# Patient Record
Sex: FEMALE | Race: WHITE | URBAN - METROPOLITAN AREA
[De-identification: names, ages, dates, MRNs, and addresses within clinical notes are randomized per-mention and may not be internally consistent; named-entity substitution may affect disease eponyms.]

---

## 2017-05-24 ENCOUNTER — APPOINTMENT (OUTPATIENT)
Dept: URBAN - METROPOLITAN AREA CLINIC 211 | Age: 47
Setting detail: DERMATOLOGY
End: 2017-05-26

## 2017-05-24 DIAGNOSIS — D22 MELANOCYTIC NEVI: ICD-10-CM

## 2017-05-24 DIAGNOSIS — L663 OTHER SPECIFIED DISEASES OF HAIR AND HAIR FOLLICLES: ICD-10-CM

## 2017-05-24 DIAGNOSIS — L91.8 OTHER HYPERTROPHIC DISORDERS OF THE SKIN: ICD-10-CM

## 2017-05-24 DIAGNOSIS — L738 OTHER SPECIFIED DISEASES OF HAIR AND HAIR FOLLICLES: ICD-10-CM

## 2017-05-24 DIAGNOSIS — L82.1 OTHER SEBORRHEIC KERATOSIS: ICD-10-CM

## 2017-05-24 DIAGNOSIS — D18.0 HEMANGIOMA: ICD-10-CM

## 2017-05-24 DIAGNOSIS — L81.4 OTHER MELANIN HYPERPIGMENTATION: ICD-10-CM

## 2017-05-24 DIAGNOSIS — D485 NEOPLASM OF UNCERTAIN BEHAVIOR OF SKIN: ICD-10-CM

## 2017-05-24 PROBLEM — D18.01 HEMANGIOMA OF SKIN AND SUBCUTANEOUS TISSUE: Status: ACTIVE | Noted: 2017-05-24

## 2017-05-24 PROBLEM — D48.5 NEOPLASM OF UNCERTAIN BEHAVIOR OF SKIN: Status: ACTIVE | Noted: 2017-05-24

## 2017-05-24 PROBLEM — D22.5 MELANOCYTIC NEVI OF TRUNK: Status: ACTIVE | Noted: 2017-05-24

## 2017-05-24 PROBLEM — L02.422 FURUNCLE OF LEFT AXILLA: Status: ACTIVE | Noted: 2017-05-24

## 2017-05-24 PROCEDURE — OTHER REASSURANCE: OTHER

## 2017-05-24 PROCEDURE — OTHER OBSERVATION: OTHER

## 2017-05-24 PROCEDURE — OTHER BENIGN DESTRUCTION COSMETIC: OTHER

## 2017-05-24 PROCEDURE — OTHER TREATMENT REGIMEN: OTHER

## 2017-05-24 PROCEDURE — OTHER MIPS QUALITY: OTHER

## 2017-05-24 PROCEDURE — OTHER OBSERVATION AND MEASURE: OTHER

## 2017-05-24 PROCEDURE — OTHER COUNSELING: OTHER

## 2017-05-24 PROCEDURE — 99203 OFFICE O/P NEW LOW 30 MIN: CPT | Mod: 25

## 2017-05-24 PROCEDURE — OTHER BIOPSY BY SHAVE METHOD: OTHER

## 2017-05-24 PROCEDURE — 11100: CPT

## 2017-05-24 PROCEDURE — OTHER PRESCRIPTION: OTHER

## 2017-05-24 RX ORDER — BENZOYL PEROXIDE 3.18 G/60G
AEROSOL, FOAM TOPICAL
Qty: 1 | Refills: 2 | Status: ERX | COMMUNITY
Start: 2017-05-24

## 2017-05-24 ASSESSMENT — LOCATION SIMPLE DESCRIPTION DERM
LOCATION SIMPLE: ABDOMEN
LOCATION SIMPLE: RIGHT BACK
LOCATION SIMPLE: RIGHT UPPER BACK
LOCATION SIMPLE: NOSE
LOCATION SIMPLE: LEFT POSTERIOR AXILLA
LOCATION SIMPLE: UPPER BACK

## 2017-05-24 ASSESSMENT — LOCATION DETAILED DESCRIPTION DERM
LOCATION DETAILED: SUPERIOR THORACIC SPINE
LOCATION DETAILED: LEFT POSTERIOR AXILLA
LOCATION DETAILED: NASAL ROOT
LOCATION DETAILED: EPIGASTRIC SKIN
LOCATION DETAILED: RIGHT INFERIOR MEDIAL UPPER BACK
LOCATION DETAILED: RIGHT RIB CAGE
LOCATION DETAILED: RIGHT SUPERIOR LATERAL UPPER BACK
LOCATION DETAILED: RIGHT SUPERIOR UPPER BACK
LOCATION DETAILED: SUBXIPHOID

## 2017-05-24 ASSESSMENT — LOCATION ZONE DERM
LOCATION ZONE: NOSE
LOCATION ZONE: TRUNK
LOCATION ZONE: AXILLAE

## 2017-05-24 NOTE — PROCEDURE: BENIGN DESTRUCTION COSMETIC
Post-Care Instructions: I reviewed with the patient in detail post-care instructions. Patient is to wear sunprotection, and avoid picking at any of the treated lesions. Pt may apply Vaseline to crusted or scabbing areas.
Consent: The patient's consent was obtained including but not limited to risks of crusting, scabbing, blistering, scarring, darker or lighter pigmentary change, recurrence, incomplete removal and infection.
Detail Level: Detailed
Anesthesia Type: 1% lidocaine with epinephrine
Anesthesia Volume In Cc: 0.5

## 2017-05-24 NOTE — PROCEDURE: OBSERVATION
Detail Level: Detailed
Size Of Lesion: 2x2mm
Size Of Lesion: 4x2mm
Size Of Lesion: 3x2mm
Size Of Lesion: 5x2mm

## 2017-05-24 NOTE — PROCEDURE: BIOPSY BY SHAVE METHOD
Anesthesia Volume In Cc (Will Not Render If 0): 0.5
Detail Level: Detailed
Bill 90843 For Specimen Handling/Conveyance To Laboratory?: no
Curettage Text: The wound bed was treated with curettage after the biopsy was performed.
Consent: Written consent was obtained and risks were reviewed including but not limited to scarring, infection, bleeding, scabbing, incomplete removal, nerve damage and allergy to anesthesia.
Biopsy Type: H and E
X Size Of Lesion In Cm: 0.6
Electrodesiccation Text: The wound bed was treated with electrodesiccation after the biopsy was performed.
Billing Type: Third-Party Bill
Type Of Destruction Used: Curettage
Biopsy Method: Dermablade
Hemostasis: Aluminum Chloride
Silver Nitrate Text: The wound bed was treated with silver nitrate after the biopsy was performed.
Electrodesiccation And Curettage Text: The wound bed was treated with electrodesiccation and curettage after the biopsy was performed.
Notification Instructions: Patient will be notified of biopsy results. However, patient instructed to call the office if not contacted within 2 weeks.
Dressing: bandage
Anesthesia Type: 1% lidocaine with epinephrine
Additional Anesthesia Volume In Cc (Will Not Render If 0): 0
Post-Care Instructions: I reviewed with the patient in detail post-care instructions. Patient is to keep the biopsy site dry overnight, and then apply bacitracin twice daily until healed. Patient may apply hydrogen peroxide soaks to remove any crusting.
Wound Care: Vaseline
Cryotherapy Text: The wound bed was treated with cryotherapy after the biopsy was performed.

## 2017-05-24 NOTE — PROCEDURE: MIPS QUALITY
Quality 226: Preventive Care And Screening: Tobacco Use: Screening And Cessation Intervention: Patient screened for tobacco and never smoked
Quality 131: Pain Assessment And Follow-Up: Pain assessment documented as positive using a standardized tool AND a follow-up plan is documented
Quality 110: Preventive Care And Screening: Influenza Immunization: Influenza Immunization not Administered because Patient Refused.
Quality 130: Documentation Of Current Medications In The Medical Record: Current Medications Documented
Quality 265: Biopsy Follow-Up: Biopsy results reviewed, communicated, tracked, and documented
Detail Level: Detailed
Quality 431: Preventive Care And Screening: Unhealthy Alcohol Use - Screening: Patient screened for unhealthy alcohol use using a single question and scores less than 2 times per year
Quality 400a: One-Time Screening For Hepatitis C Virus (Hcv) For All Patients: Documentation of patient reason(s) for not receiving one-time screening for HCV infection

## 2017-05-24 NOTE — PROCEDURE: TREATMENT REGIMEN
Detail Level: Zone
Plan: Mentioned will likely refer to MOHs if bx shows BCC
Initiate Treatment: BenzePro foam AA BID. Pt to RTC if rash spreads or worsens (photos reviewed by LENARD)

## 2017-05-31 ENCOUNTER — RX ONLY (RX ONLY)
Age: 47
End: 2017-05-31

## 2017-05-31 RX ORDER — BENZOYL PEROXIDE 3.18 G/60G
AEROSOL, FOAM TOPICAL
Qty: 1 | Refills: 2 | Status: ERX

## 2019-12-20 ENCOUNTER — APPOINTMENT (OUTPATIENT)
Dept: URBAN - METROPOLITAN AREA CLINIC 211 | Age: 49
Setting detail: DERMATOLOGY
End: 2019-12-30

## 2019-12-20 ENCOUNTER — APPOINTMENT (OUTPATIENT)
Dept: URBAN - METROPOLITAN AREA CLINIC 211 | Age: 49
Setting detail: DERMATOLOGY
End: 2019-12-27

## 2019-12-20 DIAGNOSIS — Z41.9 ENCOUNTER FOR PROCEDURE FOR PURPOSES OTHER THAN REMEDYING HEALTH STATE, UNSPECIFIED: ICD-10-CM

## 2019-12-20 DIAGNOSIS — L98.8 OTHER SPECIFIED DISORDERS OF THE SKIN AND SUBCUTANEOUS TISSUE: ICD-10-CM

## 2019-12-20 DIAGNOSIS — D18.0 HEMANGIOMA: ICD-10-CM

## 2019-12-20 DIAGNOSIS — Z85.828 PERSONAL HISTORY OF OTHER MALIGNANT NEOPLASM OF SKIN: ICD-10-CM

## 2019-12-20 DIAGNOSIS — D22 MELANOCYTIC NEVI: ICD-10-CM

## 2019-12-20 DIAGNOSIS — L82.1 OTHER SEBORRHEIC KERATOSIS: ICD-10-CM

## 2019-12-20 DIAGNOSIS — R21 RASH AND OTHER NONSPECIFIC SKIN ERUPTION: ICD-10-CM

## 2019-12-20 DIAGNOSIS — L81.4 OTHER MELANIN HYPERPIGMENTATION: ICD-10-CM

## 2019-12-20 PROBLEM — D18.01 HEMANGIOMA OF SKIN AND SUBCUTANEOUS TISSUE: Status: ACTIVE | Noted: 2019-12-20

## 2019-12-20 PROBLEM — D22.5 MELANOCYTIC NEVI OF TRUNK: Status: ACTIVE | Noted: 2019-12-20

## 2019-12-20 PROBLEM — J30.1 ALLERGIC RHINITIS DUE TO POLLEN: Status: ACTIVE | Noted: 2019-12-20

## 2019-12-20 PROCEDURE — OTHER MIPS QUALITY: OTHER

## 2019-12-20 PROCEDURE — OTHER PRESCRIPTION: OTHER

## 2019-12-20 PROCEDURE — OTHER OTHER (COSMETIC): OTHER

## 2019-12-20 PROCEDURE — OTHER COUNSELING: OTHER

## 2019-12-20 PROCEDURE — OTHER OBSERVATION AND MEASURE: OTHER

## 2019-12-20 PROCEDURE — OTHER REASSURANCE: OTHER

## 2019-12-20 PROCEDURE — 99214 OFFICE O/P EST MOD 30 MIN: CPT

## 2019-12-20 PROCEDURE — OTHER OBSERVATION: OTHER

## 2019-12-20 PROCEDURE — OTHER COSMETIC CONSULTATION: BOTULINUM TOXIN: OTHER

## 2019-12-20 PROCEDURE — OTHER TREATMENT REGIMEN: OTHER

## 2019-12-20 RX ORDER — TRIAMCINOLONE ACETONIDE 0.25 MG/G
CREAM TOPICAL
Qty: 1 | Refills: 0 | Status: ERX | COMMUNITY
Start: 2019-12-20

## 2019-12-20 ASSESSMENT — LOCATION DETAILED DESCRIPTION DERM
LOCATION DETAILED: SUPERIOR THORACIC SPINE
LOCATION DETAILED: EPIGASTRIC SKIN
LOCATION DETAILED: RIGHT INFERIOR MEDIAL UPPER BACK
LOCATION DETAILED: LEFT INFERIOR MEDIAL FOREHEAD
LOCATION DETAILED: NASAL ROOT
LOCATION DETAILED: SUBXIPHOID
LOCATION DETAILED: RIGHT SUPERIOR LATERAL UPPER BACK
LOCATION DETAILED: RIGHT AXILLARY VAULT
LOCATION DETAILED: RIGHT SUPERIOR UPPER BACK
LOCATION DETAILED: RIGHT RIB CAGE

## 2019-12-20 ASSESSMENT — LOCATION ZONE DERM
LOCATION ZONE: TRUNK
LOCATION ZONE: AXILLAE
LOCATION ZONE: FACE
LOCATION ZONE: NOSE

## 2019-12-20 ASSESSMENT — LOCATION SIMPLE DESCRIPTION DERM
LOCATION SIMPLE: UPPER BACK
LOCATION SIMPLE: ABDOMEN
LOCATION SIMPLE: RIGHT AXILLARY VAULT
LOCATION SIMPLE: RIGHT BACK
LOCATION SIMPLE: NOSE
LOCATION SIMPLE: RIGHT UPPER BACK
LOCATION SIMPLE: LEFT FOREHEAD

## 2019-12-20 NOTE — PROCEDURE: TREATMENT REGIMEN
Initiate Treatment: TAC 0.025% cream (local)
Plan: Discussed with patient to use a 3 blade razor and replace them after 7 uses. Recommended to patient Aveeno shave foam. Advised to try Vanicream Deodorant. Asked patient to start noting when the rash occurs and to apply TAC cream at that time.\\n\\nI discussed possible rash that she describes could be either folliculitis or contact dermatitis
Otc Regimen: Vanicream Deodorant
Detail Level: Zone

## 2019-12-20 NOTE — PROCEDURE: MIPS QUALITY
Quality 431: Preventive Care And Screening: Unhealthy Alcohol Use - Screening: Patient screened for unhealthy alcohol use using a single question and scores less than 2 times per year
Quality 110: Preventive Care And Screening: Influenza Immunization: Influenza immunization was not ordered or administered, reason not given
Quality 131: Pain Assessment And Follow-Up: Pain assessment using a standardized tool is documented as negative, no follow-up plan required
Quality 226: Preventive Care And Screening: Tobacco Use: Screening And Cessation Intervention: Patient screened for tobacco use and is an ex/non-smoker
Detail Level: Detailed
Quality 130: Documentation Of Current Medications In The Medical Record: Current Medications Documented

## 2019-12-20 NOTE — PROCEDURE: MIPS QUALITY
Quality 226: Preventive Care And Screening: Tobacco Use: Screening And Cessation Intervention: Patient screened for tobacco use and is an ex/non-smoker
Quality 431: Preventive Care And Screening: Unhealthy Alcohol Use - Screening: Patient screened for unhealthy alcohol use using a single question and scores less than 2 times per year
Quality 130: Documentation Of Current Medications In The Medical Record: Current Medications Documented
Detail Level: Detailed
Quality 110: Preventive Care And Screening: Influenza Immunization: Influenza immunization was not ordered or administered, reason not given

## 2019-12-20 NOTE — PROCEDURE: OBSERVATION
Detail Level: Detailed
Size Of Lesion: 3x2mm
Size Of Lesion: 2x2mm
Size Of Lesion: 5x2mm
Size Of Lesion: 4x2mm

## 2019-12-20 NOTE — PROCEDURE: OTHER (COSMETIC)
Detail Level: Zone
Other (Free Text): Cosmetic Consultation\\nPE:uneven pigmentation, dullness to skin, excess subcutaneous fat in the submental area and horizontal neck lines\\n\\nPLAN:\\n\\n1.  Pt is not a candidate for CoolSculpting due to the narrowness of her chin.  Spoke briefly to pt about Kybella. It pt was not interested in that route.  Offer Dr. Phani Wright contact info for surgery but she was not interested in that option either.\\n\\n2.  Rec pt begin to incorporate regular facials every 4-6 weeks.  Rec a silk peel.  The procedure was reviewed in detail, r&b and post tx expectations.  Informational handout given with a cq.\\n\\n\\nNOTE:\\n\\Joi indications, treatment expectations (including management of any possible irritations), protocols, risks and benefits, pre/post care are reviewed.  Patient understands that multiple treatments may be necessary for optimum results and that on going maintenance with at-home products and additional office visits or treatments may be needed to enhance and extend the desired results.\\n\\Joi questions were addressed.\\n\\nRTC-tbd

## 2019-12-20 NOTE — HPI: OTHER
Condition:: Cosmetic
Please Describe Your Condition:: Pt is interested in options for excess fat and horizontal neck lines on her neck.  Pt is also interested in options for her skin.  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  Pt uses a cleanser and moisturizer daily.  Pt does not want to do anything too drastic and likes natural things.  Medications, allergies and medical hx were reviewed.

## 2019-12-23 ENCOUNTER — APPOINTMENT (OUTPATIENT)
Dept: URBAN - METROPOLITAN AREA CLINIC 211 | Age: 49
Setting detail: DERMATOLOGY
End: 2019-12-26

## 2019-12-23 DIAGNOSIS — Z41.9 ENCOUNTER FOR PROCEDURE FOR PURPOSES OTHER THAN REMEDYING HEALTH STATE, UNSPECIFIED: ICD-10-CM

## 2019-12-23 PROCEDURE — OTHER OTHER (COSMETIC): OTHER

## 2019-12-23 PROCEDURE — OTHER MIPS QUALITY: OTHER

## 2019-12-23 ASSESSMENT — LOCATION SIMPLE DESCRIPTION DERM: LOCATION SIMPLE: LEFT CHEEK

## 2019-12-23 ASSESSMENT — LOCATION DETAILED DESCRIPTION DERM: LOCATION DETAILED: LEFT INFERIOR MEDIAL MALAR CHEEK

## 2019-12-23 ASSESSMENT — LOCATION ZONE DERM: LOCATION ZONE: FACE

## 2019-12-23 NOTE — HPI: OTHER
Condition:: Cosmetic
Please Describe Your Condition:: comes in for a chief complaint of Cosmetic. Pt presents for silk peel to full face for skin maintenance.   Medications, allergies and medical history were reviewed with patient

## 2019-12-23 NOTE — PROCEDURE: OTHER (COSMETIC)
Other (Free Text): Silk Peel Dermalinfusion\\n\\nPE: dullness, dehydrated, skin maintenance \\n \\nPrior to treatment, all but not limited to treatment indications and expectations(including management of any possible irritations) protocols, risks and benefits were reviewed in detail, including post treatment expectations. All questions were answered prior to administering the treatment.\\n\\nTreatment # 1/1\\nTreatment Site: full face\\n\\nCleanse: [place] Gentle cleanser, Shari Clarifying Polish, PCA nutrient toner\\n\\nSelected Serum: Ultra Hydrating\\nGrit Size: 9-30\\nPSI: 2.5-2.6\\n\\nZO Invisapeel, Cherry Garcia Mask was gently massaged on and occluded with a warm schwarz towel.  Mask was removed within a few minutes or as tolerated.\\n\\nProducts applied: Shari Mist, Obagi CRX day lotion, Elta MD Lip balm, DEJ eye cream, Obagi SPF + tint\\n\\nNotes:\\n\\n1. Discussed HA fillers and Botox.  Procedures reviewed in detail, R&B and post treatment expectations.  Rationale was given along with a CQ.\\n\\Joi indications, treatment expectations (including management of any possible irritations), protocols, risks and benefits, pre/post care are reviewed. Additional information can be found on consent documentation. Patient understands that multiple treatments may be necessary for optimum results and that on going maintenance with at-home products and additional office visits or treatments may be needed to enhance and extend the desired results.\\n\\nStandard protocol was done.  Following treatment, the expected mild erythema was observed. Patient tolerated the procedure well without immediate complication. Post care was reviewed and a follow up appointment was recommended.\\n\\Joi questions were addressed.\\n\\nRTC: 4-6 weeks Other (Free Text): Silk Peel Dermalinfusion\\n\\nPE: dullness, dehydrated, skin maintenance \\n \\nPrior to treatment, all but not limited to treatment indications and expectations(including management of any possible irritations) protocols, risks and benefits were reviewed in detail, including post treatment expectations. All questions were answered prior to administering the treatment.\\n\\nTreatment # 1/1\\nTreatment Site: full face\\n\\nCleanse: [place] Gentle cleanser, Shari Clarifying Polish, PCA nutrient toner\\n\\nSelected Serum: Ultra Hydrating\\nGrit Size: 9-30\\nPSI: 2.5-2.6\\n\\nZO Invisapeel, Cherry Garcia Mask was gently massaged on and occluded with a warm schwarz towel.  Mask was removed within a few minutes or as tolerated.\\n\\nProducts applied: Shari Mist, Obagi CRX day lotion, Elta MD Lip balm, DEJ eye cream, Obagi SPF + tint\\n\\nNotes:\\n\\n1. Discussed HA fillers and Botox.  Procedures reviewed in detail, R&B and post treatment expectations.  Rationale was given along with a CQ.\\n\\Joi indications, treatment expectations (including management of any possible irritations), protocols, risks and benefits, pre/post care are reviewed. Additional information can be found on consent documentation. Patient understands that multiple treatments may be necessary for optimum results and that on going maintenance with at-home products and additional office visits or treatments may be needed to enhance and extend the desired results.\\n\\nStandard protocol was done.  Following treatment, the expected mild erythema was observed. Patient tolerated the procedure well without immediate complication. Post care was reviewed and a follow up appointment was recommended.\\n\\Joi questions were addressed.\\n\\nRTC: 4-6 weeks

## 2024-07-25 ENCOUNTER — APPOINTMENT (OUTPATIENT)
Dept: URBAN - METROPOLITAN AREA CLINIC 211 | Age: 54
Setting detail: DERMATOLOGY
End: 2024-07-26

## 2024-07-25 DIAGNOSIS — Z12.83 ENCOUNTER FOR SCREENING FOR MALIGNANT NEOPLASM OF SKIN: ICD-10-CM

## 2024-07-25 DIAGNOSIS — L81.4 OTHER MELANIN HYPERPIGMENTATION: ICD-10-CM

## 2024-07-25 DIAGNOSIS — D18.0 HEMANGIOMA: ICD-10-CM

## 2024-07-25 DIAGNOSIS — D22 MELANOCYTIC NEVI: ICD-10-CM

## 2024-07-25 DIAGNOSIS — Z71.89 OTHER SPECIFIED COUNSELING: ICD-10-CM

## 2024-07-25 DIAGNOSIS — Z85.828 PERSONAL HISTORY OF OTHER MALIGNANT NEOPLASM OF SKIN: ICD-10-CM

## 2024-07-25 DIAGNOSIS — L82.1 OTHER SEBORRHEIC KERATOSIS: ICD-10-CM

## 2024-07-25 PROBLEM — D18.01 HEMANGIOMA OF SKIN AND SUBCUTANEOUS TISSUE: Status: ACTIVE | Noted: 2024-07-25

## 2024-07-25 PROBLEM — D22.5 MELANOCYTIC NEVI OF TRUNK: Status: ACTIVE | Noted: 2024-07-25

## 2024-07-25 PROCEDURE — OTHER OBSERVATION AND MEASURE: OTHER

## 2024-07-25 PROCEDURE — OTHER OBSERVATION: OTHER

## 2024-07-25 PROCEDURE — OTHER MIPS QUALITY: OTHER

## 2024-07-25 PROCEDURE — 99203 OFFICE O/P NEW LOW 30 MIN: CPT

## 2024-07-25 PROCEDURE — OTHER REASSURANCE: OTHER

## 2024-07-25 PROCEDURE — OTHER SUNSCREEN RECOMMENDATIONS: OTHER

## 2024-07-25 PROCEDURE — OTHER COUNSELING: OTHER

## 2024-07-25 ASSESSMENT — LOCATION ZONE DERM
LOCATION ZONE: NOSE
LOCATION ZONE: TRUNK

## 2024-07-25 ASSESSMENT — LOCATION DETAILED DESCRIPTION DERM
LOCATION DETAILED: RIGHT RIB CAGE
LOCATION DETAILED: EPIGASTRIC SKIN
LOCATION DETAILED: RIGHT SUPERIOR LATERAL UPPER BACK
LOCATION DETAILED: RIGHT SUPERIOR UPPER BACK
LOCATION DETAILED: NASAL ROOT
LOCATION DETAILED: LEFT MEDIAL INFERIOR CHEST
LOCATION DETAILED: SUBXIPHOID

## 2024-07-25 ASSESSMENT — LOCATION SIMPLE DESCRIPTION DERM
LOCATION SIMPLE: NOSE
LOCATION SIMPLE: RIGHT UPPER BACK
LOCATION SIMPLE: RIGHT BACK
LOCATION SIMPLE: CHEST
LOCATION SIMPLE: ABDOMEN

## 2024-07-25 NOTE — PROCEDURE: OBSERVATION
Detail Level: Detailed
Size Of Lesion: 3x2mm
Size Of Lesion: 5x2mm
Size Of Lesion: 4x2mm
Size Of Lesion: 2x2mm

## 2024-07-25 NOTE — PROCEDURE: SUNSCREEN RECOMMENDATIONS
Detail Level: Detailed
General Sunscreen Counseling: Recommend broad spectrum sunscreen with a SPF of 30 or higher. Sunscreens should be applied at least 15 minutes prior to expected sun exposure and then every 2 hours after that as long as sun exposure continues. If swimming or exercising sunscreen should be reapplied every 45 minutes to an hour after getting wet or sweating.  One ounce, or the equivalent of a shot glass full of sunscreen, is adequate to protect the skin not covered by a bathing suit. Sun protective clothing can be used in lieu of sunscreen but must be worn the entire time you are exposed to the sun's rays.
left upper arm